# Patient Record
Sex: FEMALE | Race: OTHER | HISPANIC OR LATINO | URBAN - METROPOLITAN AREA
[De-identification: names, ages, dates, MRNs, and addresses within clinical notes are randomized per-mention and may not be internally consistent; named-entity substitution may affect disease eponyms.]

---

## 2022-05-09 ENCOUNTER — OFFICE VISIT (OUTPATIENT)
Dept: URBAN - METROPOLITAN AREA CLINIC 42 | Facility: CLINIC | Age: 37
End: 2022-05-09
Payer: SELF-PAY

## 2022-05-09 DIAGNOSIS — K92.1 HEMATOCHEZIA: ICD-10-CM

## 2022-05-09 PROCEDURE — 99244 OFF/OP CNSLTJ NEW/EST MOD 40: CPT | Performed by: INTERNAL MEDICINE

## 2022-05-09 RX ORDER — METFORMIN HYDROCHLORIDE 500 MG/1
1 TABLET WITH A MEAL TABLET, FILM COATED ORAL ONCE A DAY
Status: ACTIVE | COMMUNITY

## 2022-05-09 NOTE — HPI-TODAY'S VISIT:
The patient was referred by Dr. Tiff Medina MD for hematochezia .   A copy of this document is being forwarded to the referring provider.  The patient complains of having hematochezia once/month but has been less frequent before. Denies changes in bowel habits. No abdominal pain.  No weight loss.  Never had a colonoscopy.  No family hx of colon CA.

## 2022-06-21 ENCOUNTER — OFFICE VISIT (OUTPATIENT)
Dept: URBAN - METROPOLITAN AREA SURGERY CENTER 13 | Facility: SURGERY CENTER | Age: 37
End: 2022-06-21
Payer: SELF-PAY

## 2022-06-21 DIAGNOSIS — K64.8 BLEEDING INTERNAL HEMORRHOIDS: ICD-10-CM

## 2022-06-21 DIAGNOSIS — K92.1 ACUTE MELENA: ICD-10-CM

## 2022-06-21 PROCEDURE — 46221 LIGATION OF HEMORRHOID(S): CPT | Performed by: INTERNAL MEDICINE

## 2022-06-21 PROCEDURE — 45378 DIAGNOSTIC COLONOSCOPY: CPT | Performed by: INTERNAL MEDICINE

## 2022-06-21 PROCEDURE — G8907 PT DOC NO EVENTS ON DISCHARG: HCPCS | Performed by: INTERNAL MEDICINE

## 2022-07-08 ENCOUNTER — OFFICE VISIT (OUTPATIENT)
Dept: URBAN - METROPOLITAN AREA CLINIC 42 | Facility: CLINIC | Age: 37
End: 2022-07-08
Payer: SELF-PAY

## 2022-07-08 ENCOUNTER — DASHBOARD ENCOUNTERS (OUTPATIENT)
Age: 37
End: 2022-07-08

## 2022-07-08 VITALS
DIASTOLIC BLOOD PRESSURE: 75 MMHG | HEART RATE: 92 BPM | BODY MASS INDEX: 33.79 KG/M2 | SYSTOLIC BLOOD PRESSURE: 131 MMHG | HEIGHT: 61 IN | WEIGHT: 179 LBS | TEMPERATURE: 97.1 F

## 2022-07-08 DIAGNOSIS — K64.9 HEMORRHOIDS WITHOUT COMPLICATION: ICD-10-CM

## 2022-07-08 DIAGNOSIS — K62.5 RECTAL BLEEDING: ICD-10-CM

## 2022-07-08 PROCEDURE — 46221 LIGATION OF HEMORRHOID(S): CPT | Performed by: INTERNAL MEDICINE

## 2022-07-08 PROCEDURE — 99213 OFFICE O/P EST LOW 20 MIN: CPT | Performed by: INTERNAL MEDICINE

## 2022-07-08 RX ORDER — METFORMIN HYDROCHLORIDE 500 MG/1
1 TABLET WITH A MEAL TABLET, FILM COATED ORAL ONCE A DAY
Status: ACTIVE | COMMUNITY

## 2022-07-08 NOTE — HPI-TODAY'S VISIT:
The patient is following up today for rectal bleeding.  Patient has a colonoscopy done on 06/21/2022. Result was normal. she had 2 hemorrhoids that were banded. Patient said rectal bleeding hasn't revolved. She is here for follow up.